# Patient Record
(demographics unavailable — no encounter records)

---

## 2025-06-06 NOTE — HISTORY OF PRESENT ILLNESS
[Delivery Date: ___] : on [unfilled] [] : delivered by vaginal delivery [Male] : Delivery History: baby boy [Back to Normal] : is back to normal in size [None] : no vaginal bleeding [Examination Of The Breasts] : breasts are normal [Doing Well] : is doing well [No Sign of Infection] : is showing no signs of infection [Breastfeeding] : not currently nursing [[id number]] : 39yo  female s/p  2725 presents today for postpartum exam. She is formula feeding, she  for a short time. She reports good support at home. Denies SI/HI/SA/HA. No issues with bowel or bladder.  [de-identified] : Formular feeding [de-identified] : Area of folliculitis noted on abdomen  [de-identified] : Continue prenatal vitamins. Discussed outlets available for any emotional changes. RTO 4 months for annual exam  She will use fertility awareness for contraception coupled with condoms